# Patient Record
Sex: MALE | Race: WHITE | NOT HISPANIC OR LATINO | Employment: UNEMPLOYED | URBAN - METROPOLITAN AREA
[De-identification: names, ages, dates, MRNs, and addresses within clinical notes are randomized per-mention and may not be internally consistent; named-entity substitution may affect disease eponyms.]

---

## 2024-03-12 DIAGNOSIS — Z91.010 PEANUT ALLERGY: Primary | ICD-10-CM

## 2024-03-12 RX ORDER — EPINEPHRINE 0.3 MG/.3ML
1 INJECTION SUBCUTANEOUS AS NEEDED
Qty: 2 EACH | Refills: 2 | Status: SHIPPED | OUTPATIENT
Start: 2024-03-12 | End: 2025-03-12

## 2024-03-12 RX ORDER — EPINEPHRINE 0.3 MG/.3ML
INJECTION SUBCUTANEOUS
Status: CANCELLED | OUTPATIENT
Start: 2024-03-12

## 2024-03-12 RX ORDER — EPINEPHRINE 0.3 MG/.3ML
INJECTION SUBCUTANEOUS
COMMUNITY

## 2024-06-24 ENCOUNTER — APPOINTMENT (OUTPATIENT)
Dept: PRIMARY CARE | Facility: CLINIC | Age: 24
End: 2024-06-24
Payer: COMMERCIAL

## 2024-06-24 ENCOUNTER — LAB (OUTPATIENT)
Dept: LAB | Facility: LAB | Age: 24
End: 2024-06-24
Payer: COMMERCIAL

## 2024-06-24 VITALS
BODY MASS INDEX: 22.46 KG/M2 | SYSTOLIC BLOOD PRESSURE: 120 MMHG | WEIGHT: 175 LBS | HEART RATE: 74 BPM | DIASTOLIC BLOOD PRESSURE: 80 MMHG | OXYGEN SATURATION: 99 % | HEIGHT: 74 IN | TEMPERATURE: 98.5 F

## 2024-06-24 DIAGNOSIS — Z11.59 SCREENING FOR VIRAL DISEASE: ICD-10-CM

## 2024-06-24 DIAGNOSIS — Z02.0 SCHOOL PHYSICAL EXAM: ICD-10-CM

## 2024-06-24 DIAGNOSIS — Z11.1 SCREENING-PULMONARY TB: ICD-10-CM

## 2024-06-24 DIAGNOSIS — Z11.1 SCREENING EXAMINATION FOR PULMONARY TUBERCULOSIS: ICD-10-CM

## 2024-06-24 DIAGNOSIS — Z02.0 SCHOOL PHYSICAL EXAM: Primary | ICD-10-CM

## 2024-06-24 PROCEDURE — 86706 HEP B SURFACE ANTIBODY: CPT

## 2024-06-24 PROCEDURE — 87340 HEPATITIS B SURFACE AG IA: CPT

## 2024-06-24 PROCEDURE — 86735 MUMPS ANTIBODY: CPT

## 2024-06-24 PROCEDURE — 86481 TB AG RESPONSE T-CELL SUSP: CPT

## 2024-06-24 PROCEDURE — 86765 RUBEOLA ANTIBODY: CPT

## 2024-06-24 PROCEDURE — 86787 VARICELLA-ZOSTER ANTIBODY: CPT

## 2024-06-24 PROCEDURE — 86704 HEP B CORE ANTIBODY TOTAL: CPT

## 2024-06-24 PROCEDURE — 86317 IMMUNOASSAY INFECTIOUS AGENT: CPT

## 2024-06-24 PROCEDURE — 99395 PREV VISIT EST AGE 18-39: CPT | Performed by: FAMILY MEDICINE

## 2024-06-24 PROCEDURE — 36415 COLL VENOUS BLD VENIPUNCTURE: CPT

## 2024-06-24 NOTE — PROGRESS NOTES
"Subjective   Patient ID: Spencer Noonan \"Espinoza\" is a 23 y.o. male who presents for Annual Exam (Needs form filled out for school ).  HPI  Patient is studying respioratory therapist.   Still works for [employer].   He got the allopecia at the age of 12   He is active   No acute complaints, feeling well overall.       Review of Systems    Past Medical History:   Diagnosis Date    Anxiety disorder, unspecified 08/23/2021    Anxiety and depression    Cutaneous abscess of left upper limb 09/19/2015    Abscess of forearm, left    Personal history of other diseases of male genital organs 08/28/2020    History of epididymitis       Past Surgical History:   Procedure Laterality Date    OTHER SURGICAL HISTORY  02/14/2022    No history of surgery      Social History     Socioeconomic History    Marital status: Single     Spouse name: None    Number of children: None    Years of education: None    Highest education level: None   Occupational History    None   Tobacco Use    Smoking status: Some Days     Types: Cigars    Smokeless tobacco: Never   Vaping Use    Vaping status: Never Used   Substance and Sexual Activity    Alcohol use: Yes    Drug use: Never    Sexual activity: None   Other Topics Concern    None   Social History Narrative    None     Social Determinants of Health     Financial Resource Strain: Not on file   Food Insecurity: Not on file   Transportation Needs: Not on file   Physical Activity: Not on file   Stress: Not on file   Social Connections: Not on file   Intimate Partner Violence: Not on file   Housing Stability: Not on file      No family history on file.    MEDICATIONS AND ALLERGIES:    ALLERGIES Peanut    MEDICATIONS   Current Outpatient Medications on File Prior to Visit   Medication Sig Dispense Refill    EPINEPHrine (Epipen) 0.3 mg/0.3 mL injection syringe Inject 0.3 mL (0.3 mg) into the muscle if needed for anaphylaxis (in case of anaphylaxis , then call 911). 2 each 2    EPINEPHrine 0.3 mg/0.3 mL " "injection syringe Inject into the muscle.       No current facility-administered medications on file prior to visit.              Objective   Visit Vitals  /80   Pulse 74   Temp 36.9 °C (98.5 °F)   Ht 1.88 m (6' 2\")   Wt 79.4 kg (175 lb)   SpO2 99%   BMI 22.47 kg/m²   Smoking Status Some Days   BSA 2.04 m²          8/28/2020     1:39 PM 1/29/2021     9:11 AM 8/23/2021    11:16 AM 2/14/2022     3:05 PM 4/19/2022    10:34 AM 6/24/2024     2:05 PM   Vitals   Systolic   104 108 110 120   Diastolic   66 72 64 80   Heart Rate   78 88 72 74   Temp 35.8 °C (96.5 °F) 37.1 °C (98.7 °F) 36.9 °C (98.4 °F) 36.8 °C (98.3 °F)  36.9 °C (98.5 °F)   Height (in) 1.905 m (6' 3\")  1.918 m (6' 3.5\") 1.905 m (6' 3\")  1.88 m (6' 2\")   Weight (lb) 170 176.5 177.56 172 175 175   BMI 21.25 kg/m2 22.06 kg/m2 21.9 kg/m2 21.5 kg/m2 21.87 kg/m2 22.47 kg/m2   BSA (m2) 2.02 m2 2.06 m2 2.07 m2 2.03 m2 2.05 m2 2.04 m2   Visit Report      Report     Physical Exam  Constitutional:       Appearance: Normal appearance. He is normal weight.   HENT:      Head: Normocephalic.      Right Ear: Tympanic membrane normal.      Left Ear: Tympanic membrane normal.      Nose: Nose normal.      Mouth/Throat:      Pharynx: Oropharynx is clear.   Eyes:      Pupils: Pupils are equal, round, and reactive to light.   Cardiovascular:      Rate and Rhythm: Normal rate and regular rhythm.      Pulses: Normal pulses.      Heart sounds: Normal heart sounds.   Pulmonary:      Effort: Pulmonary effort is normal.      Breath sounds: Normal breath sounds. No stridor. No rhonchi.   Musculoskeletal:         General: No swelling or tenderness.   Skin:     Coloration: Skin is not jaundiced or pale.   Neurological:      General: No focal deficit present.      Mental Status: He is alert and oriented to person, place, and time. Mental status is at baseline.      Cranial Nerves: No cranial nerve deficit.      Sensory: No sensory deficit.      Motor: No weakness.      Coordination: " Coordination normal.      Gait: Gait normal.      Deep Tendon Reflexes: Reflexes normal.   Psychiatric:         Mood and Affect: Mood normal.         Behavior: Behavior normal.         Thought Content: Thought content normal.         Judgment: Judgment normal.         1. School physical exam    Exam is unremarkable at this time. N    Continue healthy diet and exercise  Routine immunizations   Reviewed   Will order titers.     Limiting use of alcohol, reduce or abstain from tobacco use, abstain from substance abuse    Procedures   Continue with routine eye exams  Continue with routine dental exams  Continue with routine Dermatology / Skin checks  Colonoscopy due:     - Hepatitis B core antibody, total; Future  - Hepatitis B Surface Antigen; Future    2. Screening for viral disease    - Hepatitis B surface antibody; Future  - Mumps Antibody, IgG; Future  - Rubella Antibody, IgG; Future  - Rubeola Antibody, IgG; Future  - Varicella Zoster Antibody, IgG; Future  - Hepatitis B core antibody, total; Future  - Hepatitis B Surface Antigen; Future    3. Screening examination for pulmonary tuberculosis    - T-Spot TB; Future  - Hepatitis B core antibody, total; Future  - Hepatitis B Surface Antigen; Future    4. Screening-pulmonary TB    - T-Spot TB; Future  - Hepatitis B core antibody, total; Future  - Hepatitis B Surface Antigen; Future

## 2024-06-25 LAB
HBV CORE AB SER QL: NONREACTIVE
HBV SURFACE AB SER-ACNC: 71.2 MIU/ML
HBV SURFACE AG SERPL QL IA: NONREACTIVE
MEV IGG SER QL IA: POSITIVE
MUMPS IGG ANTIBODY INDEX: 1 IA
MUV IGG SER IA-ACNC: ABNORMAL
RUBEOLA IGG ANTIBODY INDEX: 3.5 IA
RUBV IGG SERPL IA-ACNC: 0.9 IA
RUBV IGG SERPL QL IA: NORMAL
VARICELLA ZOSTER IGG INDEX: 0.7 IA
VZV IGG SER QL IA: NEGATIVE

## 2024-06-26 LAB
NIL(NEG) CONTROL SPOT COUNT: NORMAL
PANEL A SPOT COUNT: 0
PANEL B SPOT COUNT: 0
POS CONTROL SPOT COUNT: NORMAL
T-SPOT. TB INTERPRETATION: NEGATIVE

## 2024-07-11 ENCOUNTER — TELEPHONE (OUTPATIENT)
Dept: PRIMARY CARE | Facility: CLINIC | Age: 24
End: 2024-07-11
Payer: COMMERCIAL

## 2024-07-11 NOTE — TELEPHONE ENCOUNTER
LMOM for patient to call and schedule an appt to get 2 MMR and 2 Varicella shots, per Dr. Justice and his blood work titers.

## 2024-07-29 ENCOUNTER — APPOINTMENT (OUTPATIENT)
Dept: PRIMARY CARE | Facility: CLINIC | Age: 24
End: 2024-07-29
Payer: COMMERCIAL

## 2024-07-29 PROCEDURE — 90471 IMMUNIZATION ADMIN: CPT | Performed by: FAMILY MEDICINE

## 2024-07-29 PROCEDURE — 90716 VAR VACCINE LIVE SUBQ: CPT | Performed by: FAMILY MEDICINE

## 2024-07-29 PROCEDURE — 90472 IMMUNIZATION ADMIN EACH ADD: CPT | Performed by: FAMILY MEDICINE

## 2024-07-29 PROCEDURE — 90707 MMR VACCINE SC: CPT | Performed by: FAMILY MEDICINE

## 2024-09-04 ENCOUNTER — APPOINTMENT (OUTPATIENT)
Dept: PRIMARY CARE | Facility: CLINIC | Age: 24
End: 2024-09-04
Payer: COMMERCIAL

## 2024-09-04 PROCEDURE — 90472 IMMUNIZATION ADMIN EACH ADD: CPT | Performed by: FAMILY MEDICINE

## 2024-09-04 PROCEDURE — 90471 IMMUNIZATION ADMIN: CPT | Performed by: FAMILY MEDICINE

## 2024-09-04 PROCEDURE — 90707 MMR VACCINE SC: CPT | Performed by: FAMILY MEDICINE

## 2024-09-04 PROCEDURE — 90716 VAR VACCINE LIVE SUBQ: CPT | Performed by: FAMILY MEDICINE

## 2025-03-05 ENCOUNTER — APPOINTMENT (OUTPATIENT)
Dept: PRIMARY CARE | Facility: CLINIC | Age: 25
End: 2025-03-05
Payer: COMMERCIAL

## 2025-03-06 ENCOUNTER — OFFICE VISIT (OUTPATIENT)
Dept: PRIMARY CARE | Facility: CLINIC | Age: 25
End: 2025-03-06
Payer: COMMERCIAL

## 2025-03-06 VITALS
DIASTOLIC BLOOD PRESSURE: 71 MMHG | SYSTOLIC BLOOD PRESSURE: 113 MMHG | RESPIRATION RATE: 16 BRPM | HEART RATE: 76 BPM | HEIGHT: 74 IN | BODY MASS INDEX: 22.47 KG/M2 | OXYGEN SATURATION: 98 %

## 2025-03-06 DIAGNOSIS — Z00.00 PHYSICAL EXAM: ICD-10-CM

## 2025-03-06 DIAGNOSIS — R07.9 CHEST PAIN, UNSPECIFIED TYPE: Primary | ICD-10-CM

## 2025-03-06 PROCEDURE — 99214 OFFICE O/P EST MOD 30 MIN: CPT | Performed by: FAMILY MEDICINE

## 2025-03-06 PROCEDURE — 93000 ELECTROCARDIOGRAM COMPLETE: CPT | Performed by: FAMILY MEDICINE

## 2025-03-06 NOTE — PROGRESS NOTES
"Subjective   Patient ID: Spencer Noonan \"Radha" is a 24 y.o. male who presents for Chest Pain (CHEST PAIN GOING DOWN TO LEFT ABRAHAM/BRACHIAL DOWN TO THE ELBOW-HEART ISSUES ON DADS SIDE-WORKS IN [employer] SO DOESN'T KNOW IF ITS MUSCULAR OR ANXIETY/RASH/BUMP ON BACK RIGHT HIP-TWO WEEKS. FEELS IT AROUND WAISTBAND OF PANTS ).  HPI  R BUTTOCK INCLUSION CUST FOR FEW MONTH ONE , NOT GROWING IN SIZE   PAINFUL NEEDS TO BE CUT OUT.     UNCOMFORTABLE PAIN , NOTICBLE DAYS MORE THEN OTHERS, FEELS LIKE DISCOMORT PUSHING ON IT STOPS PAIN.   SOMEDAYS , LEFT OT THE STERNUM   EVERY ONCE IN A WHILE RADIATES TO THE BICEPS.   SOMETIMES TO THE SIDE RIGHT BELOW THE ARM PIT.   SOMEDAYS   HEART DISEASE IN THE SIDE OF THE FAMILY   PATERNAL GRANDFATHER ,  AT 52 FROM HEART ATTACK , HE  POUNDS   FATHER IS 54   AND HIS AUNT TAKE CARE OF HIMSELF         Review of Systems    Past Medical History:   Diagnosis Date    Anxiety disorder, unspecified 2021    Anxiety and depression    Cutaneous abscess of left upper limb 2015    Abscess of forearm, left    Personal history of other diseases of male genital organs 2020    History of epididymitis       Past Surgical History:   Procedure Laterality Date    OTHER SURGICAL HISTORY  2022    No history of surgery      Social History     Socioeconomic History    Marital status: Single   Tobacco Use    Smoking status: Some Days     Types: Cigars    Smokeless tobacco: Never   Vaping Use    Vaping status: Never Used   Substance and Sexual Activity    Alcohol use: Yes    Drug use: Never      No family history on file.    MEDICATIONS AND ALLERGIES:    ALLERGIES Peanut    MEDICATIONS   Current Outpatient Medications on File Prior to Visit   Medication Sig Dispense Refill    EPINEPHrine (Epipen) 0.3 mg/0.3 mL injection syringe Inject 0.3 mL (0.3 mg) into the muscle if needed for anaphylaxis (in case of anaphylaxis , then call 911). 2 each 2    EPINEPHrine 0.3 mg/0.3 mL injection syringe " "Inject into the muscle.       No current facility-administered medications on file prior to visit.              Objective   Visit Vitals  /71   Pulse 76   Resp 16   Ht 1.88 m (6' 2\")   SpO2 98%   BMI 22.47 kg/m²   Smoking Status Some Days   BSA 2.04 m²          8/28/2020     1:39 PM 1/29/2021     9:11 AM 8/23/2021    11:16 AM 2/14/2022     3:05 PM 4/19/2022    10:34 AM 6/24/2024     2:05 PM 3/6/2025     3:50 PM   Vitals   Systolic   104 108 110 120 113   Diastolic   66 72 64 80 71   Heart Rate   78 88 72 74 76   Temp 35.8 °C (96.5 °F) 37.1 °C (98.7 °F) 36.9 °C (98.4 °F) 36.8 °C (98.3 °F)  36.9 °C (98.5 °F)    Resp       16   Height 1.905 m (6' 3\")  1.918 m (6' 3.5\") 1.905 m (6' 3\")  1.88 m (6' 2\") 1.88 m (6' 2\")   Weight (lb) 170 176.5 177.56 172 175 175    BMI 21.25 kg/m2 22.06 kg/m2 21.9 kg/m2 21.5 kg/m2 21.87 kg/m2 22.47 kg/m2 22.47 kg/m2   BSA (m2) 2.02 m2 2.06 m2 2.07 m2 2.03 m2 2.05 m2 2.04 m2 2.04 m2   Visit Report      Report Report     Physical Exam      Heart sounds noraml   Lungs clear   Tenderness on palpating the chest wall   Assessment & Plan  Chest pain, unspecified type  Advised to go to the ED if it happens again   Will order stress test and TTE   EKG today without acute findings   We explaiend riks of missing a cardaic event leading to disablity or death   If workup reassuring will do furhter tesitng for cervical spine stenosis   Orders:    Transthoracic Echo (TTE) Complete; Future    Stress Test; Future    XR chest 2 views; Future    Physical exam    Orders:    CBC and Auto Differential; Future    Comprehensive Metabolic Panel; Future    Hemoglobin A1C; Future    Lipid Panel; Future    Prostate Specific Antigen; Future    TSH with reflex to Free T4 if abnormal; Future    Urinalysis with Reflex Culture and Microscopic; Future             "

## 2025-03-21 LAB
ALBUMIN SERPL-MCNC: 4.9 G/DL (ref 3.6–5.1)
ALP SERPL-CCNC: 70 U/L (ref 36–130)
ALT SERPL-CCNC: 10 U/L (ref 9–46)
ANION GAP SERPL CALCULATED.4IONS-SCNC: 8 MMOL/L (CALC) (ref 7–17)
APPEARANCE UR: CLEAR
AST SERPL-CCNC: 14 U/L (ref 10–40)
BACTERIA #/AREA URNS HPF: ABNORMAL /HPF
BACTERIA UR CULT: ABNORMAL
BASOPHILS # BLD AUTO: 69 CELLS/UL (ref 0–200)
BASOPHILS NFR BLD AUTO: 1.5 %
BILIRUB SERPL-MCNC: 0.5 MG/DL (ref 0.2–1.2)
BILIRUB UR QL STRIP: NEGATIVE
BUN SERPL-MCNC: 14 MG/DL (ref 7–25)
CALCIUM SERPL-MCNC: 9.2 MG/DL (ref 8.6–10.3)
CHLORIDE SERPL-SCNC: 104 MMOL/L (ref 98–110)
CHOLEST SERPL-MCNC: 166 MG/DL
CHOLEST/HDLC SERPL: 4.3 (CALC)
CO2 SERPL-SCNC: 27 MMOL/L (ref 20–32)
COLOR UR: YELLOW
CREAT SERPL-MCNC: 0.86 MG/DL (ref 0.6–1.24)
EGFRCR SERPLBLD CKD-EPI 2021: 123 ML/MIN/1.73M2
EOSINOPHIL # BLD AUTO: 129 CELLS/UL (ref 15–500)
EOSINOPHIL NFR BLD AUTO: 2.8 %
ERYTHROCYTE [DISTWIDTH] IN BLOOD BY AUTOMATED COUNT: 12 % (ref 11–15)
EST. AVERAGE GLUCOSE BLD GHB EST-MCNC: 100 MG/DL
EST. AVERAGE GLUCOSE BLD GHB EST-SCNC: 5.5 MMOL/L
GLUCOSE SERPL-MCNC: 93 MG/DL (ref 65–139)
GLUCOSE UR QL STRIP: NEGATIVE
HBA1C MFR BLD: 5.1 % OF TOTAL HGB
HCT VFR BLD AUTO: 46.1 % (ref 38.5–50)
HDLC SERPL-MCNC: 39 MG/DL
HGB BLD-MCNC: 15.4 G/DL (ref 13.2–17.1)
HGB UR QL STRIP: NEGATIVE
HYALINE CASTS #/AREA URNS LPF: ABNORMAL /LPF
KETONES UR QL STRIP: ABNORMAL
LDLC SERPL CALC-MCNC: 110 MG/DL (CALC)
LEUKOCYTE ESTERASE UR QL STRIP: NEGATIVE
LYMPHOCYTES # BLD AUTO: 1426 CELLS/UL (ref 850–3900)
LYMPHOCYTES NFR BLD AUTO: 31 %
MCH RBC QN AUTO: 30.2 PG (ref 27–33)
MCHC RBC AUTO-ENTMCNC: 33.4 G/DL (ref 32–36)
MCV RBC AUTO: 90.4 FL (ref 80–100)
MONOCYTES # BLD AUTO: 529 CELLS/UL (ref 200–950)
MONOCYTES NFR BLD AUTO: 11.5 %
NEUTROPHILS # BLD AUTO: 2447 CELLS/UL (ref 1500–7800)
NEUTROPHILS NFR BLD AUTO: 53.2 %
NITRITE UR QL STRIP: NEGATIVE
NONHDLC SERPL-MCNC: 127 MG/DL (CALC)
PH UR STRIP: 7 [PH] (ref 5–8)
PLATELET # BLD AUTO: 323 THOUSAND/UL (ref 140–400)
PMV BLD REES-ECKER: 10.1 FL (ref 7.5–12.5)
POTASSIUM SERPL-SCNC: 4.3 MMOL/L (ref 3.5–5.3)
PROT SERPL-MCNC: 6.9 G/DL (ref 6.1–8.1)
PROT UR QL STRIP: NEGATIVE
PSA SERPL-MCNC: 0.55 NG/ML
RBC # BLD AUTO: 5.1 MILLION/UL (ref 4.2–5.8)
RBC #/AREA URNS HPF: ABNORMAL /HPF
SERVICE CMNT-IMP: ABNORMAL
SODIUM SERPL-SCNC: 139 MMOL/L (ref 135–146)
SP GR UR STRIP: 1.02 (ref 1–1.03)
SQUAMOUS #/AREA URNS HPF: ABNORMAL /HPF
TRIGL SERPL-MCNC: 80 MG/DL
TSH SERPL-ACNC: 2.91 MIU/L (ref 0.4–4.5)
WBC # BLD AUTO: 4.6 THOUSAND/UL (ref 3.8–10.8)
WBC #/AREA URNS HPF: ABNORMAL /HPF

## 2025-05-28 ENCOUNTER — OFFICE VISIT (OUTPATIENT)
Dept: PRIMARY CARE | Facility: CLINIC | Age: 25
End: 2025-05-28
Payer: COMMERCIAL

## 2025-05-28 VITALS
DIASTOLIC BLOOD PRESSURE: 82 MMHG | HEIGHT: 75 IN | SYSTOLIC BLOOD PRESSURE: 129 MMHG | OXYGEN SATURATION: 92 % | WEIGHT: 178.2 LBS | HEART RATE: 78 BPM | BODY MASS INDEX: 22.16 KG/M2 | TEMPERATURE: 97.7 F

## 2025-05-28 DIAGNOSIS — Z11.1 SCREENING EXAMINATION FOR PULMONARY TUBERCULOSIS: ICD-10-CM

## 2025-05-28 DIAGNOSIS — F90.9 ATTENTION DEFICIT HYPERACTIVITY DISORDER (ADHD), UNSPECIFIED ADHD TYPE: ICD-10-CM

## 2025-05-28 DIAGNOSIS — Z02.0 SCHOOL PHYSICAL EXAM: Primary | ICD-10-CM

## 2025-05-28 DIAGNOSIS — Z11.1 SCREENING-PULMONARY TB: ICD-10-CM

## 2025-05-28 PROCEDURE — 99214 OFFICE O/P EST MOD 30 MIN: CPT | Performed by: FAMILY MEDICINE

## 2025-05-28 PROCEDURE — 3008F BODY MASS INDEX DOCD: CPT | Performed by: FAMILY MEDICINE

## 2025-05-28 RX ORDER — LISDEXAMFETAMINE DIMESYLATE 30 MG/1
30 CAPSULE ORAL EVERY MORNING
Qty: 30 CAPSULE | Refills: 0 | Status: SHIPPED | OUTPATIENT
Start: 2025-06-27 | End: 2025-07-27

## 2025-05-28 RX ORDER — LISDEXAMFETAMINE DIMESYLATE 30 MG/1
30 CAPSULE ORAL EVERY MORNING
Qty: 30 CAPSULE | Refills: 0 | Status: SHIPPED | OUTPATIENT
Start: 2025-07-27 | End: 2025-08-26

## 2025-05-28 RX ORDER — LISDEXAMFETAMINE DIMESYLATE 30 MG/1
30 CAPSULE ORAL EVERY MORNING
Qty: 30 CAPSULE | Refills: 0 | Status: SHIPPED | OUTPATIENT
Start: 2025-05-28 | End: 2025-06-27

## 2025-05-28 ASSESSMENT — PATIENT HEALTH QUESTIONNAIRE - PHQ9
SUM OF ALL RESPONSES TO PHQ9 QUESTIONS 1 AND 2: 0
1. LITTLE INTEREST OR PLEASURE IN DOING THINGS: NOT AT ALL
2. FEELING DOWN, DEPRESSED OR HOPELESS: NOT AT ALL

## 2025-05-28 NOTE — PROGRESS NOTES
"Subjective   Patient ID: Spencer Noonan \"Radha" is a 24 y.o. male who presents for Follow-up (Physicall form for school and would like to discuss possible ADHD).  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   He has no contraindications , we reviewed the recommended qualification through the [facility] website.     Patient follows with a therpist and they reported that the patient has ADHD, note scanned to chart, patient wants to start stimumulants.   Review of Systems    Medical History[1]    Surgical History[2]   Social History[3]   Family History[4]    MEDICATIONS AND ALLERGIES:    ALLERGIES Peanut    MEDICATIONS   Medications Ordered Prior to Encounter[5]           Objective   Visit Vitals  /82 (BP Location: Left arm, Patient Position: Sitting, BP Cuff Size: Adult)   Pulse 78   Temp 36.5 °C (97.7 °F) (Temporal)   Ht 1.892 m (6' 2.5\")   Wt 80.8 kg (178 lb 3.2 oz)   SpO2 92%   BMI 22.57 kg/m²   Smoking Status Some Days   BSA 2.06 m²          1/29/2021     9:11 AM 8/23/2021    11:16 AM 2/14/2022     3:05 PM 4/19/2022    10:34 AM 6/24/2024     2:05 PM 3/6/2025     3:50 PM 5/28/2025     7:57 AM   Vitals   Systolic  104 108 110 120 113 129   Diastolic  66 72 64 80 71 82   BP Location       Left arm   Heart Rate  78 88 72 74 76 78   Temp 37.1 °C (98.7 °F) 36.9 °C (98.4 °F) 36.8 °C (98.3 °F)  36.9 °C (98.5 °F)  36.5 °C (97.7 °F)   Resp      16    Height  1.918 m (6' 3.5\") 1.905 m (6' 3\")  1.88 m (6' 2\") 1.88 m (6' 2\") 1.892 m (6' 2.5\")   Weight (lb) 176.5 177.56 172 175 175  178.2   BMI 22.06 kg/m2 21.9 kg/m2 21.5 kg/m2 21.87 kg/m2 22.47 kg/m2 22.47 kg/m2 22.57 kg/m2   BSA (m2) 2.06 m2 2.07 m2 2.03 m2 2.05 m2 2.04 m2 2.04 m2 2.06 m2   Visit Report     Report Report Report     Physical Exam    Constitutional: awake; alert, interactive; in no acute distress; well nourished and well developed  ENT: ears and nose were normal in appearance;  Eyes: " pupils equal and round  Pulmonary: no respiratory distress and normal respiratory rhythm and effort  Skin: normal skin color and pigmentation;  Psychiatric: oriented to person, place, and time; affect was normal and the mood was normal       Assessment & Plan  School physical exam  Notes and vaccines reviwed.        Screening examination for pulmonary tuberculosis  Will check TB test as requested  Orders:    T-Spot TB; Future    Screening-pulmonary TB  orderd  Orders:    T-Spot TB; Future    Attention deficit hyperactivity disorder (ADHD), unspecified ADHD type  Will start long acting vyvanse once daily and titrate as tolerated   Controlled substance agreement reviwed with the dhara and he signed it   I personally reviewed the [place] report for this patient and found no concern for abuse, dependence or diversion. Refill sent per pt request.   Side effects explaiened in details , dhara verbalized understanding and consented on the treamtent. Reviewed policy for controlled substances - you are not to fill early or request refills early.  Any variance from policy could results in discontinuation of therapy and discharge from our care.  See signed Controlled Substance Contract forms which are scanned.  [place] report reviewed and no suspicious activity noted.  Follow up visits every 3-6 months at minimum.    Orders:    lisdexamfetamine (Vyvanse) 30 mg capsule; Take 1 capsule (30 mg) by mouth once daily in the morning.    lisdexamfetamine (Vyvanse) 30 mg capsule; Take 1 capsule (30 mg) by mouth once daily in the morning. Do not fill before June 27, 2025.    lisdexamfetamine (Vyvanse) 30 mg capsule; Take 1 capsule (30 mg) by mouth once daily in the morning. Do not fill before July 27, 2025.                  [1]   Past Medical History:  Diagnosis Date    Anxiety disorder, unspecified 08/23/2021    Anxiety and depression    Cutaneous abscess of left upper limb 09/19/2015    Abscess of forearm, left    Personal history of  other diseases of male genital organs 08/28/2020    History of epididymitis   [2]   Past Surgical History:  Procedure Laterality Date    OTHER SURGICAL HISTORY  02/14/2022    No history of surgery   [3]   Social History  Socioeconomic History    Marital status: Single   Tobacco Use    Smoking status: Some Days     Types: Cigars    Smokeless tobacco: Never   Vaping Use    Vaping status: Never Used   Substance and Sexual Activity    Alcohol use: Yes    Drug use: Never   [4] No family history on file.  [5]   Current Outpatient Medications on File Prior to Visit   Medication Sig Dispense Refill    EPINEPHrine 0.3 mg/0.3 mL injection syringe Inject into the muscle.      EPINEPHrine (Epipen) 0.3 mg/0.3 mL injection syringe Inject 0.3 mL (0.3 mg) into the muscle if needed for anaphylaxis (in case of anaphylaxis , then call 911). 2 each 2     No current facility-administered medications on file prior to visit.

## 2025-05-31 LAB
IGNF NEG CNTRL BLD: NORMAL
M TB IFN-G BLD-IMP: NEGATIVE
MITOGEN IGNF.SPOT COUNT BLD: NORMAL
QUEST PANEL A SPOT COUNT: 0
QUEST PANEL B SPOT COUNT: 0

## 2025-06-02 ENCOUNTER — TELEPHONE (OUTPATIENT)
Dept: PRIMARY CARE | Facility: CLINIC | Age: 25
End: 2025-06-02
Payer: COMMERCIAL

## 2025-06-02 DIAGNOSIS — F90.9 ATTENTION DEFICIT HYPERACTIVITY DISORDER (ADHD), UNSPECIFIED ADHD TYPE: Primary | ICD-10-CM

## 2025-06-02 RX ORDER — LISDEXAMFETAMINE DIMESYLATE 30 MG/1
30 CAPSULE ORAL EVERY MORNING
Qty: 30 CAPSULE | Refills: 0 | Status: SHIPPED | OUTPATIENT
Start: 2025-08-01 | End: 2025-08-31

## 2025-06-02 RX ORDER — LISDEXAMFETAMINE DIMESYLATE 30 MG/1
30 CAPSULE ORAL EVERY MORNING
Qty: 30 CAPSULE | Refills: 0 | Status: SHIPPED | OUTPATIENT
Start: 2025-07-02 | End: 2025-08-01

## 2025-06-02 RX ORDER — LISDEXAMFETAMINE DIMESYLATE 30 MG/1
30 CAPSULE ORAL EVERY MORNING
Qty: 30 CAPSULE | Refills: 0 | Status: SHIPPED | OUTPATIENT
Start: 2025-06-02 | End: 2025-07-02

## 2025-06-02 NOTE — TELEPHONE ENCOUNTER
Patient is calling in asking if you can send in his vyvanse to [facility] instead of [facility]. Thank you.

## 2025-08-29 ENCOUNTER — APPOINTMENT (OUTPATIENT)
Dept: PRIMARY CARE | Facility: CLINIC | Age: 25
End: 2025-08-29
Payer: COMMERCIAL

## 2025-08-29 VITALS
SYSTOLIC BLOOD PRESSURE: 109 MMHG | RESPIRATION RATE: 16 BRPM | HEIGHT: 75 IN | HEART RATE: 69 BPM | BODY MASS INDEX: 21.64 KG/M2 | DIASTOLIC BLOOD PRESSURE: 74 MMHG | WEIGHT: 174 LBS | TEMPERATURE: 98.2 F

## 2025-08-29 DIAGNOSIS — F90.9 ATTENTION DEFICIT HYPERACTIVITY DISORDER (ADHD), UNSPECIFIED ADHD TYPE: Primary | ICD-10-CM

## 2025-08-29 PROCEDURE — 3008F BODY MASS INDEX DOCD: CPT | Performed by: FAMILY MEDICINE

## 2025-08-29 PROCEDURE — 99213 OFFICE O/P EST LOW 20 MIN: CPT | Performed by: FAMILY MEDICINE

## 2025-08-29 RX ORDER — LISDEXAMFETAMINE DIMESYLATE 30 MG/1
30 CAPSULE ORAL EVERY MORNING
Qty: 30 CAPSULE | Refills: 0 | Status: SHIPPED | OUTPATIENT
Start: 2025-10-28 | End: 2025-11-27

## 2025-08-29 RX ORDER — LISDEXAMFETAMINE DIMESYLATE 30 MG/1
30 CAPSULE ORAL EVERY MORNING
Qty: 30 CAPSULE | Refills: 0 | Status: SHIPPED | OUTPATIENT
Start: 2025-08-29 | End: 2025-09-28

## 2025-08-29 RX ORDER — LISDEXAMFETAMINE DIMESYLATE 30 MG/1
30 CAPSULE ORAL EVERY MORNING
Qty: 30 CAPSULE | Refills: 0 | Status: SHIPPED | OUTPATIENT
Start: 2025-09-28 | End: 2025-10-28

## 2025-08-30 LAB
AMPHETAMINES UR QL: NEGATIVE NG/ML
BARBITURATES UR QL: NEGATIVE NG/ML
BENZODIAZ UR QL: NEGATIVE NG/ML
BZE UR QL: NEGATIVE NG/ML
CREAT UR-MCNC: 93.1 MG/DL
FENTANYL UR QL SCN: NEGATIVE NG/ML
METHADONE UR QL: NEGATIVE NG/ML
OPIATES UR QL: NEGATIVE NG/ML
OXIDANTS UR QL: NEGATIVE MCG/ML
OXYCODONE UR QL: NEGATIVE NG/ML
PCP UR QL: NEGATIVE NG/ML
PH UR: 7.4 [PH] (ref 4.5–9)
QUEST NOTES AND COMMENTS: NORMAL
THC UR QL: NEGATIVE NG/ML

## 2025-09-04 ENCOUNTER — RESULTS FOLLOW-UP (OUTPATIENT)
Dept: PRIMARY CARE | Facility: CLINIC | Age: 25
End: 2025-09-04
Payer: COMMERCIAL

## 2025-12-05 ENCOUNTER — APPOINTMENT (OUTPATIENT)
Dept: PRIMARY CARE | Facility: CLINIC | Age: 25
End: 2025-12-05
Payer: COMMERCIAL